# Patient Record
Sex: MALE | Race: BLACK OR AFRICAN AMERICAN | NOT HISPANIC OR LATINO | URBAN - METROPOLITAN AREA
[De-identification: names, ages, dates, MRNs, and addresses within clinical notes are randomized per-mention and may not be internally consistent; named-entity substitution may affect disease eponyms.]

---

## 2018-08-21 ENCOUNTER — EMERGENCY (EMERGENCY)
Facility: HOSPITAL | Age: 38
LOS: 1 days | Discharge: HOME | End: 2018-08-21
Attending: EMERGENCY MEDICINE | Admitting: EMERGENCY MEDICINE
Payer: SELF-PAY

## 2018-08-21 VITALS
HEART RATE: 62 BPM | WEIGHT: 164.91 LBS | SYSTOLIC BLOOD PRESSURE: 129 MMHG | RESPIRATION RATE: 18 BRPM | OXYGEN SATURATION: 100 % | DIASTOLIC BLOOD PRESSURE: 83 MMHG | TEMPERATURE: 98 F

## 2018-08-21 DIAGNOSIS — R10.30 LOWER ABDOMINAL PAIN, UNSPECIFIED: ICD-10-CM

## 2018-08-21 LAB — HIV 1 & 2 AB SERPL IA.RAPID: SIGNIFICANT CHANGE UP

## 2018-08-21 PROCEDURE — 99284 EMERGENCY DEPT VISIT MOD MDM: CPT

## 2018-08-21 RX ORDER — KETOROLAC TROMETHAMINE 30 MG/ML
30 SYRINGE (ML) INJECTION ONCE
Qty: 0 | Refills: 0 | Status: DISCONTINUED | OUTPATIENT
Start: 2018-08-21 | End: 2018-08-21

## 2018-08-21 RX ADMIN — Medication 30 MILLIGRAM(S): at 19:22

## 2018-08-21 NOTE — ED PROVIDER NOTE - OBJECTIVE STATEMENT
38 y/o M w/ no pmhx presents to ED w/ c/o STD/STI check. Pt had unprotected sex w/ partner and is c/o L groin pain since. Pt requesting STI check. Denies fever, abd pain, n/v/d, dysuria, penile discharge.

## 2018-08-21 NOTE — ED ADULT TRIAGE NOTE - CHIEF COMPLAINT QUOTE
pt reports left testicle/groin pain x 2 days. reports having unprotected sex 2 days ago. denies urinary sx, rash, testicle swelling. pt in NAD and sitting comfortably. sexual partner has BV.

## 2018-08-21 NOTE — ED PROVIDER NOTE - GENITOURINARY, MLM
pt circumcised, no discharge, no skin changes to penis or scrotum, no lymphadenopathy, no hernia. Testis w/ normal lie and non tender. Mild TTP in L inguinal area.

## 2018-08-21 NOTE — ED PROVIDER NOTE - MEDICAL DECISION MAKING DETAILS
pt here w/ concern of sti, normal gu exam, exam consistent w/ muscular strength given no skin changes laceration or lesions. Will send HIV, chlamydia, and gonorrhea. Treat w/ Toradol. Refer to PMD.

## 2018-08-22 LAB
C TRACH RRNA SPEC QL NAA+PROBE: SIGNIFICANT CHANGE UP
N GONORRHOEA RRNA SPEC QL NAA+PROBE: SIGNIFICANT CHANGE UP
SPECIMEN SOURCE: SIGNIFICANT CHANGE UP

## 2020-08-23 ENCOUNTER — EMERGENCY (EMERGENCY)
Facility: HOSPITAL | Age: 40
LOS: 1 days | Discharge: HOME | End: 2020-08-23
Admitting: EMERGENCY MEDICINE
Payer: MEDICAID

## 2020-08-23 VITALS
RESPIRATION RATE: 18 BRPM | HEART RATE: 98 BPM | OXYGEN SATURATION: 97 % | TEMPERATURE: 98 F | DIASTOLIC BLOOD PRESSURE: 76 MMHG | SYSTOLIC BLOOD PRESSURE: 133 MMHG

## 2020-08-23 VITALS
TEMPERATURE: 98 F | SYSTOLIC BLOOD PRESSURE: 137 MMHG | RESPIRATION RATE: 18 BRPM | OXYGEN SATURATION: 96 % | DIASTOLIC BLOOD PRESSURE: 80 MMHG | HEART RATE: 96 BPM

## 2020-08-23 DIAGNOSIS — N45.1 EPIDIDYMITIS: ICD-10-CM

## 2020-08-23 DIAGNOSIS — N50.819 TESTICULAR PAIN, UNSPECIFIED: ICD-10-CM

## 2020-08-23 LAB
APPEARANCE UR: CLEAR — SIGNIFICANT CHANGE UP
BILIRUB UR-MCNC: NEGATIVE — SIGNIFICANT CHANGE UP
COLOR SPEC: YELLOW — SIGNIFICANT CHANGE UP
DIFF PNL FLD: NEGATIVE — SIGNIFICANT CHANGE UP
GLUCOSE UR QL: NEGATIVE — SIGNIFICANT CHANGE UP
KETONES UR-MCNC: NEGATIVE — SIGNIFICANT CHANGE UP
LEUKOCYTE ESTERASE UR-ACNC: NEGATIVE — SIGNIFICANT CHANGE UP
NITRITE UR-MCNC: NEGATIVE — SIGNIFICANT CHANGE UP
PH UR: 7 — SIGNIFICANT CHANGE UP (ref 5–8)
PROT UR-MCNC: NEGATIVE MG/DL — SIGNIFICANT CHANGE UP
SP GR SPEC: 1.01 — SIGNIFICANT CHANGE UP (ref 1–1.03)
UROBILINOGEN FLD QL: 0.2 E.U./DL — SIGNIFICANT CHANGE UP

## 2020-08-23 PROCEDURE — 99284 EMERGENCY DEPT VISIT MOD MDM: CPT

## 2020-08-23 RX ORDER — CEFTRIAXONE 500 MG/1
250 INJECTION, POWDER, FOR SOLUTION INTRAMUSCULAR; INTRAVENOUS ONCE
Refills: 0 | Status: COMPLETED | OUTPATIENT
Start: 2020-08-23 | End: 2020-08-23

## 2020-08-23 RX ORDER — AZITHROMYCIN 500 MG/1
1000 TABLET, FILM COATED ORAL ONCE
Refills: 0 | Status: COMPLETED | OUTPATIENT
Start: 2020-08-23 | End: 2020-08-23

## 2020-08-23 RX ADMIN — Medication 100 MILLIGRAM(S): at 21:48

## 2020-08-23 RX ADMIN — CEFTRIAXONE 250 MILLIGRAM(S): 500 INJECTION, POWDER, FOR SOLUTION INTRAMUSCULAR; INTRAVENOUS at 21:50

## 2020-08-23 RX ADMIN — AZITHROMYCIN 1000 MILLIGRAM(S): 500 TABLET, FILM COATED ORAL at 21:48

## 2020-08-23 NOTE — ED ADULT NURSE NOTE - NS ED NURSE RECORD ANOTHER VITAL SIGN
Chief Complaint   Patient presents with   • Neck   • Back Pain     Upper   • Headache New Onset on New Symptom     Date of Injury: 01/02/2020  Initial Treatment Date: 01/02/2020  Date Last Seen: 01/02/2020  Mechanism of Onset: Other  Occupation: Clerical @ [employer]  Referred by: Paulie Ann  Primary Care Physician: Hermelinda Bush MD  Date informed consent signed: 01/02/2020  I have reviewed the past medical history, family history, social history, medications and allergies listed in the medical record as obtained by my nursing staff and support staff and agree with their documentation.  Fabiana  reports that she has never smoked. She has never used smokeless tobacco.  Fabiana has No Known Allergies.  Advance Directive Status:No    Visit Number of Current Episode: 1  Initial pain rating: 10/10  Discharged from care: No    Relevant Diagnostic Imaging/Testing:   None      SUBJECTIVE:  The patient is a pleasant 61 year old female that presents to our office today for an evaluation and treatment of neck and upper back pain and headaches. Patient rates her pain today at 10/10 with 10 being worst. Patient states she has been having headaches on the right side. They start at the base of the skull and go up. It has been going on for months. The headaches are daily. They start out light in the morning and as the day goes on they get worse.     CHIROPRACTIC PATIENT HISTORY:   How often do you experience your symptoms?  -51-75% of the day    On roughly what date did your present pain start: None reported or answered     How long does each episode last: None reported or answered  How long have you had similar pain? None reported or answered     Over time are your symptoms:No change with time     Rate how bad your symptoms are; 0 being no pain and 10 unbearable at their:  Worst 10  Best 3    Patient’s current work status: Full time  Are you still working? Yes    How much does your job require you to  lift? 0lbs.    Are you totally disabled from work? No     Are you on work restrictions?No     How did your pain start?  None reported or answered    When does your pain feel worse? None reported or answered    If you have had an adjustment or manipulation before, what date was your appointment? Na  How did it affect your symptoms? Na    What reduces the pain?  None reported or answered    Indicate any of the following diagnostic studies that you have had in the last 3 years for your complaints. None    List any hospitalizations, surgeries, or serious injuries beginning with those that concern your current problem.None reported or answered    Medications currently taking:   None    What allergies do you have? Fish    Indicate any conditions you may have had. Chicken pox, diabetes, RA arthritis, migraines and TMJ    Do you have any diet restriction? No    What position do you sleep in on your Right side, Left side  What type Is your mattress Firm    On average how many times per week do you exercise for at least 20 minutes? 2  What type of exercise do you perform? Stretching    Do you smoke or use any tobacco products? Yes    Do you drink alcoholic beverages? Yes    What other health providers have you seen for your chief complaints? None reported or answered    Patient Emotional screening was completed 01/02/2020;     HEALTH HISTORY:    Other specified gastritis without mention of h* 12/1/10         Comment:  H Pylori negative, past history of PUD 2007    RA (rheumatoid arthritis) (CMS/[facility])                             Comment: seropositive, follow s with Dr. Hurley    Migraine headache                                             Elevated fasting glucose                                        Comment: 11/2010   normal Hga1c     Type 2 diabetes mellitus without complication,* 12/23/2016    Fracture                                        2014            Comment: fracture knee cap right side per patient    Thyroid  condition                                               Comment: enlarged thyroid per patient  The patient's family health history includes:   Family History   Problem Relation Age of Onset   • Diabetes Mother    • Thyroid Mother    • Stroke Mother    • Macular degeneration Mother    • Cataracts Mother    • Dementia/Alzheimers Mother 84   • Heart Father         a fib/CHF   • Genitourinary Father         kidney disease post antibiotics   • Hypertension Father    • Cataracts Father    • Stroke Maternal Grandmother         MI age 74   • Thyroid Maternal Grandfather         hyper   • Thyroid Paternal Grandmother    • Hypertension Sister    • Cancer Maternal Uncle 70        colon CA   • NEGATIVE FAMILY HX OF Other         no breast or ovarian CA     SOCIAL HISTORY:   Social History     Tobacco Use   • Smoking status: Never Smoker   • Smokeless tobacco: Never Used   Substance Use Topics   • Alcohol use: Yes     Comment: rarely wine    • Drug use: No     Patient completed Chiropractic Patient History and Chiropractic Systems Review.  These were reviewed with the patient.    OBJECTIVE FINDINGS:     Problem focus examination revealed:  (Cervical spine) Cervical spine facet joint function is within normal limits except for her C2/3/4 facet joints that exhibited limited passive range of motion and segmental restriction with tenderness upon palpation. The following muscles were examined for normal flexibility and tone; right and left upper trapezius muscle: right and left scalene muscle; right and left levator scapulae muscle; deep neck flexor muscle; right and left Sternocleidomastoid muscle(SCM); right and left suboccipital muscle; these muscles were within normal limits except for her right trapezius muscle, right suboccipital muscle and her right scalene muscle that exhibited limited flexibility and were hypertonic at rest.    (Thoracic spine) Thoracic spine facet joint function is within normal limits except for her T3 and  T5/6 facet joints that exhibited limited passive range of motion and segmental restriction and tenderness upon palpation; The following muscles were examined for normal flexibility and tone; right and left pectoralis major muscles; right and left rhomboid muscle; right and left serratus muscle; left and right latissimus dorsi muscle; These muscles were within normal limits except for her right rhomboid muscle that exhibited limited flexibility and abnormal resting tone.    Orthopedic/Neurological tests:  (Neck) maximum foraminal compression negative; neck flexion decreased, decreased right rotation and bilateral lateral flexion; neck extension WNL; cervical distraction decreased localized pain; shoulder depressor negative for radicular symptoms, positive to right trapezius; tenderness to palpation is noted over right trapezius, right rhomboid, right scalene and right suboccipital muscle; upper extremity deep tendon reflexes +2 bilaterally; upper extremity dermatomal sensation WNL.    (Thoracic) Schepelmann's test positive right convex. Decreased thoracic extension with discomfort.    Assessment:   1. Cervical somatic dysfunction    2. Cervicalgia    3. Cervicogenic headache    4. Thoracic region somatic dysfunction    5. Pain in thoracic spine      Complicating Factors/Co-morbidities:   Chronicity of symptoms    PLAN:  Patient was evaluated and then treated with manipulation to her cervical spine via manual manipulation technique and her thoracic spine via diversified manipulation technique to improve function and passive range of motion of facet joints. Patient also treated with contract/relax stretch to muscle noted as taut in objective findings to improve flexibility and decrease strain to spinal structures.     Therapeutic Exercise/Rehab/Modalities performed today:   None performed today    Patient Instruction/Education:   Patient educated in the nature of condition and likely pain generators as well as plan of  care to resolve symptoms and improve muscular and skeletal function.  Patient noted verbal understanding of condition and is agreeable to plan of care.  Patient stated understanding of, and was in agreement with, the discussed instructions.    Goals of Care: Goal of care is to improve muscular and skeletal function and provide symptom relief. Care is planned at 2-3 times/week times 2-3 weeks dependent on patient response to care.    Other treatment options discussed with patient: None discussed today    Patient rates her pain post treatment at 4/10 with 10 being worst.    Patient is to return next week for continued care and treatment of her condition consistent with plan of care.    Length of Visit: Greater than 50% of the visit was spent counseling regarding above issues; total time spent 20 minutes.    On 1/2/2020, IErin CT scribed the services personally performed by Lobo Liz DC.    The documentation recorded by the scribe accurately and completely reflects the service(s) I personally performed and the decisions made by me.       Yes

## 2020-08-23 NOTE — ED PROVIDER NOTE - PHYSICAL EXAMINATION
VITAL SIGNS: I have reviewed nursing notes and confirm.  CONSTITUTIONAL: Well-developed; well-nourished; in no acute distress.  SKIN: Skin is warm and dry, no acute rash.  HEAD: Normocephalic; atraumatic.  EYES: PERRL, EOM intact; conjunctiva and sclera clear.  ENT: No nasal discharge; airway clear.  NECK: Supple; non tender.  CARD: S1, S2 normal; no murmurs, gallops, or rubs. Regular rate and rhythm.  RESP: No wheezes, rales or rhonchi.  ABD: Normal bowel sounds; soft; non-distended; non-tender;  no palpable or pulsating mass; no hepatosplenomegaly.  : chaperoned by PCT - circumscised, normal external genitalia, no TTP of b/l testicle, no swelling, no erythema, no urethral discharge.   EXT: Normal ROM. No clubbing, cyanosis or edema.  NEURO: Alert, oriented. Grossly unremarkable.  PSYCH: Cooperative, appropriate. VITAL SIGNS: I have reviewed nursing notes and confirm.  CONSTITUTIONAL: Well-developed; well-nourished; in no acute distress.  SKIN: Skin is warm and dry, no acute rash.  HEAD: Normocephalic; atraumatic.  EYES: PERRL, EOM intact; conjunctiva and sclera clear.  ENT: No nasal discharge; airway clear.  NECK: Supple; non tender.  CARD: S1, S2 normal; no murmurs, gallops, or rubs. Regular rate and rhythm.  RESP: No wheezes, rales or rhonchi.  ABD: Normal bowel sounds; soft; non-distended; non-tender;  no palpable or pulsating mass; no hepatosplenomegaly.  : chaperoned by PCT - circumcised, normal external genitalia, no TTP of b/l testicle, no swelling, no erythema, no urethral discharge.   EXT: Normal ROM. No clubbing, cyanosis or edema.  NEURO: Alert, oriented. Grossly unremarkable.  PSYCH: Cooperative, appropriate.

## 2020-08-23 NOTE — ED PROVIDER NOTE - NS ED ROS FT
· CONSTITUTIONAL: no fever and no chills.  · CARDIOVASCULAR: normal rate and rhythm, no chest pain and no edema.  · RESPIRATORY: no chest pain, no cough, and no shortness of breath.  · GASTROINTESTINAL: no abdominal pain, no bloating, no constipation, no diarrhea, no nausea and no vomiting.  - : +testicular pain  · MUSCULOSKELETAL: no back pain, no musculoskeletal pain, no neck pain, and no weakness.  · SKIN: no abrasions, no jaundice, no lesions, no pruritis, and no rashes.  · NEURO: no loss of consciousness, no gait abnormality, no headache, no sensory deficits, and no weakness.  · PSYCHIATRIC: no known mental health issues.

## 2020-08-23 NOTE — ED ADULT NURSE NOTE - NSIMPLEMENTINTERV_GEN_ALL_ED
Implemented All Universal Safety Interventions:  [place] to call system. Call bell, personal items and telephone within reach. Instruct patient to call for assistance. Room bathroom lighting operational. Non-slip footwear when patient is off stretcher. Physically safe environment: no spills, clutter or unnecessary equipment. Stretcher in lowest position, wheels locked, appropriate side rails in place.

## 2020-08-23 NOTE — ED PROVIDER NOTE - NSFOLLOWUPINSTRUCTIONS_ED_ALL_ED_FT
take doxycyline as prescribed.     1)  PLEASE FOLLOW-UP WITH YOUR PRIMARY CARE DOCTOR OR REFERRED SPECIALIST IN 1-2 DAYS.     2)  BRING ALL PAPERWORK FROM TODAY'S EMERGENCY ROOM  VISIT TO YOUR FOLLOW-UP VISIT.  IF YOU DO NOT HAVE A PRIMARY CARE DOCTOR PLEASE REFER TO THE OFFICE/CLINIC INFORMATION GIVEN ABOVE.  YOU MAY ALSO CALL [phone] AND ASK FOR MS. JEANNETTE TAVARES.  SHE CAN HELP YOU MAKE A FOLLOW-UP APPOINTMENT.  HER HOURS ARE 11AM-7PM MONDAY - FRIDAY.    3) PLEASE RETURN TO THE ER IMMEDIATELY OR CALL 911 FOR ANY HIGH FEVER, TROUBLE BREATHING, CHEST PAIN, WEAKNESS, VOMITING, SEVERE PAIN, OR ANY OTHER CONCERNS.

## 2020-08-23 NOTE — ED PROVIDER NOTE - OBJECTIVE STATEMENT
39 year old male with h/o epididymitis presents with testicular pain x 9 days. reports for 9 days dull ache, no pain when urinating. no swelling. no lesion. states feels similar to when he had epididymitis before. sexually active with multiple partners  Denies fever, chills, N/V/D/C, melena, hematochezia, hematuria, change in urinary/bowel function, dysuria, d/c, flank pain, HA, dizziness, focal weakness, CP, SOB, palpitations, cough, and malaise.

## 2020-08-23 NOTE — ED ADULT NURSE NOTE - OBJECTIVE STATEMENT
39y male presents to ED c/o left testicular pain. Pt states he had protective penetrative sex and unprotected oral sex 9 days ago and had been having left testicular pain and swelling since then which worsens with rest. Pt also endorses tingling sensation in right thigh. Denies penile discharge, dysuria. No PMH. A&Ox4.

## 2020-08-23 NOTE — ED PROVIDER NOTE - PATIENT PORTAL LINK FT
You can access the FollowMyHealth Patient Portal offered by [facility] by registering at the following website: [email or web address]. By joining [facility]’s FollowMyHealth portal, you will also be able to view your health information using other applications (apps) compatible with our system.

## 2020-08-23 NOTE — ED PROVIDER NOTE - CLINICAL SUMMARY MEDICAL DECISION MAKING FREE TEXT BOX
testicular pain x 9 days. normal exam. no indication for US at this time, no acute pain. no TTP of testicular, no swelling.  will cover for STD.  dc with doxycyline.

## 2020-08-25 LAB
CULTURE RESULTS: SIGNIFICANT CHANGE UP
SPECIMEN SOURCE: SIGNIFICANT CHANGE UP

## 2020-09-16 ENCOUNTER — EMERGENCY (EMERGENCY)
Facility: HOSPITAL | Age: 40
LOS: 1 days | Discharge: HOME | End: 2020-09-16
Attending: EMERGENCY MEDICINE | Admitting: EMERGENCY MEDICINE
Payer: MEDICAID

## 2020-09-16 VITALS
HEIGHT: 70 IN | RESPIRATION RATE: 20 BRPM | WEIGHT: 179.9 LBS | HEART RATE: 59 BPM | DIASTOLIC BLOOD PRESSURE: 77 MMHG | TEMPERATURE: 98 F | OXYGEN SATURATION: 97 % | SYSTOLIC BLOOD PRESSURE: 138 MMHG

## 2020-09-16 DIAGNOSIS — Z79.2 LONG TERM (CURRENT) USE OF ANTIBIOTICS: ICD-10-CM

## 2020-09-16 DIAGNOSIS — N41.0 ACUTE PROSTATITIS: ICD-10-CM

## 2020-09-16 DIAGNOSIS — R10.32 LEFT LOWER QUADRANT PAIN: ICD-10-CM

## 2020-09-16 LAB
ALBUMIN SERPL ELPH-MCNC: 4 G/DL — SIGNIFICANT CHANGE UP (ref 3.4–5)
ALP SERPL-CCNC: 77 U/L — SIGNIFICANT CHANGE UP (ref 40–120)
ALT FLD-CCNC: 18 U/L — SIGNIFICANT CHANGE UP (ref 12–42)
ANION GAP SERPL CALC-SCNC: 9 MMOL/L — SIGNIFICANT CHANGE UP (ref 9–16)
APPEARANCE UR: CLEAR — SIGNIFICANT CHANGE UP
AST SERPL-CCNC: 22 U/L — SIGNIFICANT CHANGE UP (ref 15–37)
BASOPHILS # BLD AUTO: 0.05 K/UL — SIGNIFICANT CHANGE UP (ref 0–0.2)
BASOPHILS NFR BLD AUTO: 0.9 % — SIGNIFICANT CHANGE UP (ref 0–2)
BILIRUB SERPL-MCNC: 0.3 MG/DL — SIGNIFICANT CHANGE UP (ref 0.2–1.2)
BILIRUB UR-MCNC: NEGATIVE — SIGNIFICANT CHANGE UP
BUN SERPL-MCNC: 16 MG/DL — SIGNIFICANT CHANGE UP (ref 7–23)
CALCIUM SERPL-MCNC: 9 MG/DL — SIGNIFICANT CHANGE UP (ref 8.5–10.5)
CHLORIDE SERPL-SCNC: 105 MMOL/L — SIGNIFICANT CHANGE UP (ref 96–108)
CO2 SERPL-SCNC: 26 MMOL/L — SIGNIFICANT CHANGE UP (ref 22–31)
COLOR SPEC: YELLOW — SIGNIFICANT CHANGE UP
CREAT SERPL-MCNC: 1.17 MG/DL — SIGNIFICANT CHANGE UP (ref 0.5–1.3)
DIFF PNL FLD: NEGATIVE — SIGNIFICANT CHANGE UP
EOSINOPHIL # BLD AUTO: 0.08 K/UL — SIGNIFICANT CHANGE UP (ref 0–0.5)
EOSINOPHIL NFR BLD AUTO: 1.4 % — SIGNIFICANT CHANGE UP (ref 0–6)
GLUCOSE SERPL-MCNC: 113 MG/DL — HIGH (ref 70–99)
GLUCOSE UR QL: NEGATIVE — SIGNIFICANT CHANGE UP
HCT VFR BLD CALC: 44.3 % — SIGNIFICANT CHANGE UP (ref 39–50)
HGB BLD-MCNC: 13.9 G/DL — SIGNIFICANT CHANGE UP (ref 13–17)
HIV 1 & 2 AB SERPL IA.RAPID: SIGNIFICANT CHANGE UP
IMM GRANULOCYTES NFR BLD AUTO: 0.2 % — SIGNIFICANT CHANGE UP (ref 0–1.5)
KETONES UR-MCNC: NEGATIVE — SIGNIFICANT CHANGE UP
LEUKOCYTE ESTERASE UR-ACNC: NEGATIVE — SIGNIFICANT CHANGE UP
LYMPHOCYTES # BLD AUTO: 3.37 K/UL — HIGH (ref 1–3.3)
LYMPHOCYTES # BLD AUTO: 60.7 % — HIGH (ref 13–44)
MCHC RBC-ENTMCNC: 25.7 PG — LOW (ref 27–34)
MCHC RBC-ENTMCNC: 31.4 GM/DL — LOW (ref 32–36)
MCV RBC AUTO: 81.9 FL — SIGNIFICANT CHANGE UP (ref 80–100)
MONOCYTES # BLD AUTO: 0.57 K/UL — SIGNIFICANT CHANGE UP (ref 0–0.9)
MONOCYTES NFR BLD AUTO: 10.3 % — SIGNIFICANT CHANGE UP (ref 2–14)
NEUTROPHILS # BLD AUTO: 1.47 K/UL — LOW (ref 1.8–7.4)
NEUTROPHILS NFR BLD AUTO: 26.5 % — LOW (ref 43–77)
NITRITE UR-MCNC: NEGATIVE — SIGNIFICANT CHANGE UP
NRBC # BLD: 0 /100 WBCS — SIGNIFICANT CHANGE UP (ref 0–0)
PH UR: 6.5 — SIGNIFICANT CHANGE UP (ref 5–8)
PLATELET # BLD AUTO: 187 K/UL — SIGNIFICANT CHANGE UP (ref 150–400)
POTASSIUM SERPL-MCNC: 4.6 MMOL/L — SIGNIFICANT CHANGE UP (ref 3.5–5.3)
POTASSIUM SERPL-SCNC: 4.6 MMOL/L — SIGNIFICANT CHANGE UP (ref 3.5–5.3)
PROT SERPL-MCNC: 7.6 G/DL — SIGNIFICANT CHANGE UP (ref 6.4–8.2)
PROT UR-MCNC: NEGATIVE MG/DL — SIGNIFICANT CHANGE UP
RBC # BLD: 5.41 M/UL — SIGNIFICANT CHANGE UP (ref 4.2–5.8)
RBC # FLD: 14.7 % — HIGH (ref 10.3–14.5)
SODIUM SERPL-SCNC: 140 MMOL/L — SIGNIFICANT CHANGE UP (ref 132–145)
SP GR SPEC: <=1.005 — SIGNIFICANT CHANGE UP (ref 1–1.03)
UROBILINOGEN FLD QL: 0.2 E.U./DL — SIGNIFICANT CHANGE UP
WBC # BLD: 5.55 K/UL — SIGNIFICANT CHANGE UP (ref 3.8–10.5)
WBC # FLD AUTO: 5.55 K/UL — SIGNIFICANT CHANGE UP (ref 3.8–10.5)

## 2020-09-16 PROCEDURE — 99285 EMERGENCY DEPT VISIT HI MDM: CPT

## 2020-09-16 RX ORDER — SODIUM CHLORIDE 9 MG/ML
1000 INJECTION INTRAMUSCULAR; INTRAVENOUS; SUBCUTANEOUS ONCE
Refills: 0 | Status: COMPLETED | OUTPATIENT
Start: 2020-09-16 | End: 2020-09-16

## 2020-09-16 RX ORDER — IOHEXOL 300 MG/ML
30 INJECTION, SOLUTION INTRAVENOUS ONCE
Refills: 0 | Status: COMPLETED | OUTPATIENT
Start: 2020-09-16 | End: 2020-09-16

## 2020-09-16 RX ORDER — ONDANSETRON 8 MG/1
4 TABLET, FILM COATED ORAL EVERY 4 HOURS
Refills: 0 | Status: DISCONTINUED | OUTPATIENT
Start: 2020-09-16 | End: 2020-09-20

## 2020-09-16 RX ORDER — KETOROLAC TROMETHAMINE 30 MG/ML
15 SYRINGE (ML) INJECTION ONCE
Refills: 0 | Status: DISCONTINUED | OUTPATIENT
Start: 2020-09-16 | End: 2020-09-16

## 2020-09-16 RX ADMIN — Medication 15 MILLIGRAM(S): at 22:39

## 2020-09-16 RX ADMIN — IOHEXOL 30 MILLILITER(S): 300 INJECTION, SOLUTION INTRAVENOUS at 22:39

## 2020-09-16 RX ADMIN — SODIUM CHLORIDE 1000 MILLILITER(S): 9 INJECTION INTRAMUSCULAR; INTRAVENOUS; SUBCUTANEOUS at 22:40

## 2020-09-16 RX ADMIN — SODIUM CHLORIDE 1000 MILLILITER(S): 9 INJECTION INTRAMUSCULAR; INTRAVENOUS; SUBCUTANEOUS at 22:50

## 2020-09-16 NOTE — ED PROVIDER NOTE - PHYSICAL EXAMINATION
Physical Exam  GEN: Awake, alert, non-toxic appearing, NCAT  EYES: full EOMI,  ENT: External inspection normal, normal voice,   HEAD: atraumatic  NECK: FROM neck, supple, no meningismus, trachea midline, no JVD  CV: rrr, no edema  RESP: cta bl, no tachypnea, no hypoxia, no resp distress,  GI: +BS, Soft, tender periumbilical area, no rlq tenderness, no guarding/rebound, external hemorrhoid noted, pt reports "some" pain during prostate exam, no boggy prostate  : circumcised, no scrotal swelling/tenderness/erythema, +tenderness to left aspect of perineum w/o induration/fluctuance/erythema/crepitus, no gangrene, normal cremasteric reflex   MSK: FROM all 4 extremities,   NEURO: Oriented x3, CN 2-12 grossly intact,  richardson x 4, steady gait, clear speech, Physical Exam  GEN: Awake, alert, non-toxic appearing, NCAT  EYES: full EOMI,  ENT: External inspection normal, normal voice,   HEAD: atraumatic  NECK: FROM neck, supple, no meningismus, trachea midline, no JVD  CV: rrr, no edema  RESP: cta bl, no tachypnea, no hypoxia, no resp distress,  GI: +BS, Soft, tender periumbilical area, no rlq tenderness, no guarding/rebound, external hemorrhoid noted, pt reports "some" pain during prostate exam, no boggy prostate, no palpalble hernia  : circumcised, no scrotal swelling/tenderness/erythema, +tenderness to left aspect of perineum w/o induration/fluctuance/erythema/crepitus, no gangrene, normal cremasteric reflex   MSK: FROM all 4 extremities,   LYMPH: no inguinal lymphadenopathy

## 2020-09-16 NOTE — ED PROVIDER NOTE - PROGRESS NOTE DETAILS
ct reviewed, no acute intraabd surg pathology.  UA not c/w ureteritis.  pt was sexually active w/ tender prostate, will rpt treatment of ceftriaxone/azithromycin, and nonsti treatment for prostatitis pt also endorses to me that he's having skin rash, which appears c/w tinea, will give rx for antifungal

## 2020-09-16 NOTE — ED PROVIDER NOTE - PATIENT PORTAL LINK FT
You can access the FollowMyHealth Patient Portal offered by [facility] by registering at the following website: [email or web address]. By joining [employer]’s FollowMyHealth portal, you will also be able to view your health information using other applications (apps) compatible with our system.

## 2020-09-16 NOTE — ED PROVIDER NOTE - CLINICAL SUMMARY MEDICAL DECISION MAKING FREE TEXT BOX
recent sti testing neg, pt requesting additional testing including hiv, no evidence of epididymitis/orchitis, no peg's on exam, mildly tender prostate, ?early/evolving prostatitis, lower abd pain, colitis vs referred pain from prostate, will check labs, rpt sti testing, treatment for prostatitis

## 2020-09-16 NOTE — ED PROVIDER NOTE - OBJECTIVE STATEMENT
39 yom pw left groin pain, pointing to L perineum during interview.  pt seen here ~ 3 wk ago, w/ L testicular pain, treated empirically w/ ceftriaxone/azithromycin and doxycycline, states the testicular pain resolved but now hurting in the perineum region.  pain constant.  worse w/ touch.  has not been sexually active since 3 wk ago (oral sex w/o protection, vaginal sex w/ condoms, only w/ 1 female partner).  urinary hesitancy but denies any blood in urine/semen, denies any discharge from penis.  denies rectal/penile fb insertion.  reports 3-4 days of lower abd pain, w/o nv/anorexia/painful defecation.  reports some blood in stool.  no hematemesis.  no fc.  no trauma.  no nv.  no prior hx of hernia or abd surg.  no hx of DM.

## 2020-09-16 NOTE — ED ADULT NURSE NOTE - CAS EDP DISCH TYPE
Detail Level: Simple
Medical Necessity Information: LCD Guidelines vary from payer to payer. Please check with your payer's policy to determine medical necessity.
Medical Necessity Clause: Botulinum toxin hyperhidrosis therapy is medically necessary because
Detail Level: Zone
Home

## 2020-09-16 NOTE — ED ADULT NURSE NOTE - CHPI ED NUR SYMPTOMS NEG
no pain/no decreased eating/drinking/no vomiting/no fever/no weakness/no nausea/no dizziness/no tingling/no chills

## 2020-09-16 NOTE — ED ADULT NURSE NOTE - OBJECTIVE STATEMENT
40 y/o male who reports left sided groin pain and rash- pt denies urinary symptoms and who like to be tested for STI/STD- pt denies recent unprotected  intercourse, dysuria, hematuria

## 2020-09-16 NOTE — ED PROVIDER NOTE - NSFOLLOWUPINSTRUCTIONS_ED_ALL_ED_FT
Follow up with your primary care doctor or clinics listed below if you do not have a doctor    Return immediately for any new or worsening symptoms or any new concerns

## 2020-09-17 VITALS
OXYGEN SATURATION: 100 % | RESPIRATION RATE: 18 BRPM | DIASTOLIC BLOOD PRESSURE: 82 MMHG | HEART RATE: 60 BPM | SYSTOLIC BLOOD PRESSURE: 135 MMHG | TEMPERATURE: 98 F

## 2020-09-17 LAB
C TRACH RRNA SPEC QL NAA+PROBE: SIGNIFICANT CHANGE UP
N GONORRHOEA RRNA SPEC QL NAA+PROBE: SIGNIFICANT CHANGE UP
SPECIMEN SOURCE: SIGNIFICANT CHANGE UP
T PALLIDUM AB TITR SER: NEGATIVE — SIGNIFICANT CHANGE UP

## 2020-09-17 PROCEDURE — 74177 CT ABD & PELVIS W/CONTRAST: CPT | Mod: 26

## 2020-09-17 RX ORDER — AZITHROMYCIN 500 MG/1
1000 TABLET, FILM COATED ORAL ONCE
Refills: 0 | Status: COMPLETED | OUTPATIENT
Start: 2020-09-17 | End: 2020-09-17

## 2020-09-17 RX ORDER — CEFTRIAXONE 500 MG/1
250 INJECTION, POWDER, FOR SOLUTION INTRAMUSCULAR; INTRAVENOUS ONCE
Refills: 0 | Status: COMPLETED | OUTPATIENT
Start: 2020-09-17 | End: 2020-09-17

## 2020-09-17 RX ORDER — AZTREONAM 2 G
1 VIAL (EA) INJECTION
Qty: 28 | Refills: 0
Start: 2020-09-17 | End: 2020-09-30

## 2020-09-17 RX ADMIN — CEFTRIAXONE 250 MILLIGRAM(S): 500 INJECTION, POWDER, FOR SOLUTION INTRAMUSCULAR; INTRAVENOUS at 02:35

## 2020-09-17 RX ADMIN — Medication 1 TABLET(S): at 02:35

## 2020-09-17 RX ADMIN — Medication 15 MILLIGRAM(S): at 01:28

## 2020-09-17 RX ADMIN — AZITHROMYCIN 1000 MILLIGRAM(S): 500 TABLET, FILM COATED ORAL at 02:35

## 2020-09-18 LAB
CULTURE RESULTS: SIGNIFICANT CHANGE UP
SPECIMEN SOURCE: SIGNIFICANT CHANGE UP

## 2020-11-22 ENCOUNTER — EMERGENCY (EMERGENCY)
Facility: HOSPITAL | Age: 40
LOS: 1 days | Discharge: HOME | End: 2020-11-22
Attending: EMERGENCY MEDICINE | Admitting: EMERGENCY MEDICINE
Payer: MEDICAID

## 2020-11-22 VITALS
TEMPERATURE: 98 F | SYSTOLIC BLOOD PRESSURE: 130 MMHG | HEIGHT: 70 IN | DIASTOLIC BLOOD PRESSURE: 88 MMHG | OXYGEN SATURATION: 99 % | RESPIRATION RATE: 18 BRPM | HEART RATE: 63 BPM

## 2020-11-22 VITALS
SYSTOLIC BLOOD PRESSURE: 156 MMHG | OXYGEN SATURATION: 98 % | DIASTOLIC BLOOD PRESSURE: 91 MMHG | HEART RATE: 57 BPM | RESPIRATION RATE: 18 BRPM | TEMPERATURE: 98 F

## 2020-11-22 LAB
APPEARANCE UR: CLEAR — SIGNIFICANT CHANGE UP
BILIRUB UR-MCNC: NEGATIVE — SIGNIFICANT CHANGE UP
COLOR SPEC: YELLOW — SIGNIFICANT CHANGE UP
DIFF PNL FLD: NEGATIVE — SIGNIFICANT CHANGE UP
GLUCOSE UR QL: NEGATIVE — SIGNIFICANT CHANGE UP
KETONES UR-MCNC: NEGATIVE — SIGNIFICANT CHANGE UP
LEUKOCYTE ESTERASE UR-ACNC: NEGATIVE — SIGNIFICANT CHANGE UP
NITRITE UR-MCNC: NEGATIVE — SIGNIFICANT CHANGE UP
PH UR: 5.5 — SIGNIFICANT CHANGE UP (ref 5–8)
PROT UR-MCNC: NEGATIVE MG/DL — SIGNIFICANT CHANGE UP
SP GR SPEC: <=1.005 — SIGNIFICANT CHANGE UP (ref 1–1.03)
UROBILINOGEN FLD QL: 0.2 E.U./DL — SIGNIFICANT CHANGE UP

## 2020-11-22 PROCEDURE — 76870 US EXAM SCROTUM: CPT | Mod: 26

## 2020-11-22 PROCEDURE — 99284 EMERGENCY DEPT VISIT MOD MDM: CPT

## 2020-11-22 RX ORDER — IBUPROFEN 200 MG
600 TABLET ORAL ONCE
Refills: 0 | Status: COMPLETED | OUTPATIENT
Start: 2020-11-22 | End: 2020-11-22

## 2020-11-22 RX ADMIN — Medication 600 MILLIGRAM(S): at 11:51

## 2020-11-22 NOTE — ED PROVIDER NOTE - OBJECTIVE STATEMENT
39 y/o M with no PMHx presents to the ED for sharp pain and tenderness to the testicular area. Pt reports the pain started 2 months ago s/p intercourse (with protection used). Pt was seen at this ED about 2 months ago for similar complaints upon which time he had a negative UA and was treated with Ceftriaxone / Azithromycin. He returns to the ED today for testicular pain that has become more pronounced over the past 2 weeks. He denies fevers, chills, recent intercourse, dysuria, burning with urination, penile discharge, and trauma to the area.

## 2020-11-22 NOTE — ED PROVIDER NOTE - CARE PROVIDER_API CALL
Quincy Lobo)  Urology  [address], [facility], [place] [address]  Phone: [phone]  Fax: [phone]  Follow Up Time: 7-10 Days

## 2020-11-22 NOTE — ED PROVIDER NOTE - NSFOLLOWUPINSTRUCTIONS_ED_ALL_ED_FT
It is unclear what is causing your testicular pain.  You do have varicoceles on both sides which may be responsible and are a collection of veins.  Follow up with urology.    Because you are complaining of jock itch as well and failed to respond to 1% clotrimazole, we will increase the strength and take as directed.

## 2020-11-22 NOTE — ED PROVIDER NOTE - SKIN, MLM
patchy apigmented areas across scrotum slightly hyperkeratinized bilateral inner thighs, no erythema, warmth or crepitus.

## 2020-11-22 NOTE — ED ADULT NURSE NOTE - CHPI ED NUR SYMPTOMS NEG
no abdominal distension/no chills/no blood in stool/no diarrhea/no nausea/no dysuria/no hematuria/no fever

## 2020-11-22 NOTE — ED ADULT NURSE NOTE - SUICIDE SCREENING QUESTION 3
To keep our patients safe,  Dr. Anderson decided  to use  telephone visits or video visit ,   LVM to call back.   No

## 2020-11-22 NOTE — ED PROVIDER NOTE - CLINICAL SUMMARY MEDICAL DECISION MAKING FREE TEXT BOX
Testicular pain x months, itching and failure with clotrimazole 1%.  Nl acute findings.  US and urine obtained and noted.  Varicocele present.  Will dc with uro fu and topical clotrimazole.

## 2020-11-22 NOTE — ED ADULT NURSE NOTE - OBJECTIVE STATEMENT
Pt. came in c/o of left testicular pain x2 weeks. Pt. states "I have a history of varicose veins in my left testicle. It is also a little swollen and very sensitive. Its a little lower than my right. The last time I had unprotected sex was two months ago. I sometimes feel the same pain after I have sex." PT reports dysuria/tender left testicle. Denies fever, chills, hematuria, sob, chest pain, n/v, history of testicular torsion, discharge. A&Ox3 speaking in full sentences.

## 2020-11-22 NOTE — ED PROVIDER NOTE - PATIENT PORTAL LINK FT
You can access the FollowMyHealth Patient Portal offered by [facility] by registering at the following website: [email or web address]. By joining [employer]’s FollowMyHealth portal, you will also be able to view your health information using other applications (apps) compatible with our system.

## 2020-11-22 NOTE — ED ADULT NURSE NOTE - NSIMPLEMENTINTERV_GEN_ALL_ED
Implemented All Universal Safety Interventions:  [place] to call system. Call bell, personal items and telephone within reach. Instruct patient to call for assistance. Room bathroom lighting operational. Non-slip footwear when patient is off stretcher. Physically safe environment: no spills, clutter or unnecessary equipment. Stretcher in lowest position, wheels locked, appropriate side rails in place.

## 2020-11-23 PROBLEM — Z00.00 ENCOUNTER FOR PREVENTIVE HEALTH EXAMINATION: Status: ACTIVE | Noted: 2020-11-23

## 2020-11-23 LAB
C TRACH RRNA SPEC QL NAA+PROBE: SIGNIFICANT CHANGE UP
CULTURE RESULTS: SIGNIFICANT CHANGE UP
N GONORRHOEA RRNA SPEC QL NAA+PROBE: SIGNIFICANT CHANGE UP
SPECIMEN SOURCE: SIGNIFICANT CHANGE UP
SPECIMEN SOURCE: SIGNIFICANT CHANGE UP

## 2020-11-25 DIAGNOSIS — N50.811 RIGHT TESTICULAR PAIN: ICD-10-CM

## 2020-11-25 DIAGNOSIS — N50.812 LEFT TESTICULAR PAIN: ICD-10-CM

## 2020-11-25 DIAGNOSIS — L29.1 PRURITUS SCROTI: ICD-10-CM
